# Patient Record
Sex: FEMALE | Race: WHITE | URBAN - METROPOLITAN AREA
[De-identification: names, ages, dates, MRNs, and addresses within clinical notes are randomized per-mention and may not be internally consistent; named-entity substitution may affect disease eponyms.]

---

## 2023-04-02 ENCOUNTER — PATIENT MESSAGE (OUTPATIENT)
Dept: PRIMARY CARE | Facility: CLINIC | Age: 33
End: 2023-04-02
Payer: COMMERCIAL

## 2023-04-02 DIAGNOSIS — K21.9 GERD WITHOUT ESOPHAGITIS: ICD-10-CM

## 2023-04-02 DIAGNOSIS — F41.9 ANXIETY: Primary | ICD-10-CM

## 2023-04-03 RX ORDER — CETIRIZINE HYDROCHLORIDE 10 MG/1
10 TABLET ORAL DAILY
COMMUNITY

## 2023-04-03 RX ORDER — ALPRAZOLAM 0.5 MG/1
TABLET ORAL 2 TIMES DAILY
COMMUNITY
Start: 2022-12-08 | End: 2023-04-03 | Stop reason: SDUPTHER

## 2023-04-03 RX ORDER — OMEPRAZOLE 40 MG/1
40 CAPSULE, DELAYED RELEASE ORAL
COMMUNITY
Start: 2022-12-08 | End: 2023-04-03 | Stop reason: SDUPTHER

## 2023-04-03 RX ORDER — OMALIZUMAB 202.5 MG/1.4ML
INJECTION, SOLUTION SUBCUTANEOUS
COMMUNITY

## 2023-04-03 RX ORDER — ALPRAZOLAM 0.5 MG/1
0.5 TABLET ORAL 2 TIMES DAILY PRN
Qty: 6 TABLET | Refills: 0 | Status: SHIPPED | OUTPATIENT
Start: 2023-04-03 | End: 2023-08-02 | Stop reason: SDUPTHER

## 2023-04-03 RX ORDER — OMEPRAZOLE 40 MG/1
40 CAPSULE, DELAYED RELEASE ORAL
Qty: 30 CAPSULE | Refills: 1 | Status: SHIPPED | OUTPATIENT
Start: 2023-04-03 | End: 2023-07-11 | Stop reason: SDUPTHER

## 2023-07-11 DIAGNOSIS — K21.9 GERD WITHOUT ESOPHAGITIS: ICD-10-CM

## 2023-07-11 RX ORDER — OMEPRAZOLE 40 MG/1
40 CAPSULE, DELAYED RELEASE ORAL
Qty: 30 CAPSULE | Refills: 1 | Status: SHIPPED | OUTPATIENT
Start: 2023-07-11 | End: 2023-08-02 | Stop reason: ALTCHOICE

## 2023-08-02 ENCOUNTER — OFFICE VISIT (OUTPATIENT)
Dept: PRIMARY CARE | Facility: CLINIC | Age: 33
End: 2023-08-02
Payer: COMMERCIAL

## 2023-08-02 VITALS
WEIGHT: 172 LBS | SYSTOLIC BLOOD PRESSURE: 109 MMHG | OXYGEN SATURATION: 98 % | HEART RATE: 66 BPM | BODY MASS INDEX: 25.48 KG/M2 | HEIGHT: 69 IN | DIASTOLIC BLOOD PRESSURE: 69 MMHG | TEMPERATURE: 97.8 F

## 2023-08-02 DIAGNOSIS — G43.009 MIGRAINE WITHOUT AURA AND WITHOUT STATUS MIGRAINOSUS, NOT INTRACTABLE: ICD-10-CM

## 2023-08-02 DIAGNOSIS — K21.9 GERD WITHOUT ESOPHAGITIS: Primary | ICD-10-CM

## 2023-08-02 DIAGNOSIS — F41.9 ANXIETY: ICD-10-CM

## 2023-08-02 DIAGNOSIS — F40.228 AEROPHOBIA: ICD-10-CM

## 2023-08-02 PROCEDURE — 99214 OFFICE O/P EST MOD 30 MIN: CPT | Performed by: INTERNAL MEDICINE

## 2023-08-02 PROCEDURE — 1036F TOBACCO NON-USER: CPT | Performed by: INTERNAL MEDICINE

## 2023-08-02 RX ORDER — ALPRAZOLAM 0.5 MG/1
0.5 TABLET ORAL 2 TIMES DAILY PRN
Qty: 10 TABLET | Refills: 0 | Status: SHIPPED | OUTPATIENT
Start: 2023-08-02 | End: 2023-12-21 | Stop reason: SDUPTHER

## 2023-08-02 RX ORDER — OMEPRAZOLE 40 MG/1
40 CAPSULE, DELAYED RELEASE ORAL
Qty: 60 CAPSULE | Refills: 0 | Status: SHIPPED | OUTPATIENT
Start: 2023-08-02 | End: 2023-08-31

## 2023-08-02 NOTE — PROGRESS NOTES
Subjective   Diamond Mejía is a 32 y.o. female who presents for GERD and Headache.  HPI  Past medical history significant GERD, urticaria and environmental allergies.     See initial visit regarding heartburn and indigestion.  At the time fairly recent in onset without prior history.  Symptoms were primarily waking up with epigastric discomfort and substernal burning.  Alcohol identified as a possible trigger.  We put her on 1 month of daily omeprazole 40 mg which helped and initially had resolution.  In the interim it was manageable and had a refill for as needed use but since April, without any specific known triggers she has been using it daily and still not having good control.  Persistently worse in the morning so she started taking the omeprazole first thing in the morning upon waking up but does not seem to help although she seems to be better in the early afternoon.  Also having more burping and belching associated with it.  No hemoptysis or hematemesis, no hematochezia or melena.     She also developed headaches last month associate with nausea.  Was getting them about 3 times a week last month, mostly in the morning but seem to have resolved as she has not had any this month.  Described as a throbbing/pounding in the front and back of her head.  Complains of associated nausea and light sensitivity, better with lying down in a dark room and taking ibuprofen and/or Excedrin has helped.  No prior symptoms.  No known triggers.  No associated aura or vision changes.  No head trauma.      **COPIED FORWARD FOR REFERENCE**     Family history:  Maternal grandfather-myocardial infarction at 52  Paternal grandfather-pacemaker  Both grandmothers-lung cancer, only 1 was a smoker  Paternal aunt and paternal grandmother-breast cancer     She lives with her girlfriend in [place].  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  No history of tobacco use and no drug use.  Social alcohol use, maybe 3 or 4  "servings in the weekend.  Healthy diet, regular exercise.     Providers:  Allergy and immunology-Dr. Whitaker  GYN -Ruba Landaverde       Objective   /69   Pulse 66   Temp 36.6 °C (97.8 °F)   Ht 1.753 m (5' 9\")   Wt 78 kg (172 lb)   SpO2 98%   BMI 25.40 kg/m²    Physical Exam  Gen: NAD, pleasant, A&;Ox3  HEENT: PERRL, EOMI, MMM, OP clear  Neck: supple, no thyromegaly, no JVD, normal carotid upstroke  Pulm: lungs CTAB, good air movement  CV: RRR, no m/r/g, 2+ DP pulses  Abd: NABS, soft, NT, ND no HSM  Ext: no peripheral edema  Neuro: CN II-XII intact, no focal sensory or motor deficits, normal reflexes    Assessment/Plan     GERD: Without red flags but with poor control recently despite daily PPI.  Also with family history of reflux symptoms.  Trial of maximal medical therapy with twice daily PPI for 1 month, depending on response and recurrence would likely pursue EGD    Headaches: Seems to have classic migraines without aura, currently with decreased frequency after initial flare, possibly due to stress?  -For now continue as needed treatment with ibuprofen or Excedrin  -Reassess based on frequency and triggers     Situational anxiety: Xanax 0.5 mg as needed, I have personally reviewed the [place] [facility] report for this patient. I have considered the risks of abuse, dependence, addiction and diversion in regards to prescribing this type of medication.I did not find any suspicious activity noted on the [place] [facility] report, and therefore, I believe that it is clinically appropriate for this patient to be prescribed this medication.      **COPIED FORWARD FOR REFERENCE**  Recurrent epistaxis: Mild, infrequent; recommended intranasal moisturizer spray, can always reconsider and get ENT referral in the future (see initial visit)     Health maintenance  -Consider metabolic screening, low risk  -Mammogram: We will check with cousins regarding possible genetic " evaluation  -Pap: Normal and HPV (-) 3/5/2022  -DEXA: N/A  -Last colonoscopy: No early screening indicated  -Smoking history: Never  -Counseled regarding diet and exercise  -Immunizations: Reportedly up-to-date  -Followup annually and as needed   Problem List Items Addressed This Visit    None  Visit Diagnoses       GERD without esophagitis    -  Primary    Relevant Medications    omeprazole (PriLOSEC) 40 mg DR capsule    Anxiety        Relevant Medications    ALPRAZolam (Xanax) 0.5 mg tablet                 Montana Ricci MD

## 2023-08-03 PROBLEM — F41.9 ANXIETY: Status: ACTIVE | Noted: 2023-08-03

## 2023-08-03 PROBLEM — J30.89 ENVIRONMENTAL AND SEASONAL ALLERGIES: Status: ACTIVE | Noted: 2023-08-03

## 2023-08-03 PROBLEM — M17.31 POST-TRAUMATIC OSTEOARTHRITIS OF RIGHT KNEE: Status: RESOLVED | Noted: 2018-12-28 | Resolved: 2023-08-03

## 2023-08-03 PROBLEM — R04.0 RECURRENT EPISTAXIS: Status: RESOLVED | Noted: 2023-08-03 | Resolved: 2023-08-03

## 2023-08-03 PROBLEM — L50.2 URTICARIA DUE TO HEAT: Status: ACTIVE | Noted: 2023-08-03

## 2023-08-03 PROBLEM — K21.9 GERD (GASTROESOPHAGEAL REFLUX DISEASE): Status: ACTIVE | Noted: 2023-08-03

## 2023-08-03 PROBLEM — F41.8 SITUATIONAL ANXIETY: Status: ACTIVE | Noted: 2023-08-03

## 2023-08-03 PROBLEM — F40.228 AEROPHOBIA: Status: ACTIVE | Noted: 2023-08-03

## 2023-08-03 PROBLEM — G43.009 MIGRAINE WITHOUT AURA AND WITHOUT STATUS MIGRAINOSUS, NOT INTRACTABLE: Status: ACTIVE | Noted: 2023-08-03

## 2023-08-31 DIAGNOSIS — K21.9 GERD WITHOUT ESOPHAGITIS: ICD-10-CM

## 2023-08-31 RX ORDER — OMEPRAZOLE 40 MG/1
40 CAPSULE, DELAYED RELEASE ORAL
Qty: 60 CAPSULE | Refills: 0 | Status: SHIPPED | OUTPATIENT
Start: 2023-08-31 | End: 2023-11-02

## 2023-11-02 DIAGNOSIS — K21.9 GERD WITHOUT ESOPHAGITIS: ICD-10-CM

## 2023-11-02 RX ORDER — OMEPRAZOLE 40 MG/1
40 CAPSULE, DELAYED RELEASE ORAL
Qty: 60 CAPSULE | Refills: 0 | Status: SHIPPED | OUTPATIENT
Start: 2023-11-02 | End: 2023-12-07

## 2023-12-07 DIAGNOSIS — K21.9 GERD WITHOUT ESOPHAGITIS: ICD-10-CM

## 2023-12-07 RX ORDER — OMEPRAZOLE 40 MG/1
40 CAPSULE, DELAYED RELEASE ORAL
Qty: 60 CAPSULE | Refills: 0 | Status: SHIPPED | OUTPATIENT
Start: 2023-12-07 | End: 2024-04-01 | Stop reason: WASHOUT

## 2023-12-21 ENCOUNTER — OFFICE VISIT (OUTPATIENT)
Dept: PRIMARY CARE | Facility: CLINIC | Age: 33
End: 2023-12-21
Payer: COMMERCIAL

## 2023-12-21 VITALS
DIASTOLIC BLOOD PRESSURE: 64 MMHG | SYSTOLIC BLOOD PRESSURE: 111 MMHG | BODY MASS INDEX: 25.77 KG/M2 | TEMPERATURE: 97.3 F | HEIGHT: 69 IN | HEART RATE: 61 BPM | WEIGHT: 174 LBS | OXYGEN SATURATION: 97 %

## 2023-12-21 DIAGNOSIS — R04.0 RECURRENT EPISTAXIS: ICD-10-CM

## 2023-12-21 DIAGNOSIS — K21.9 GERD WITHOUT ESOPHAGITIS: Primary | ICD-10-CM

## 2023-12-21 DIAGNOSIS — F41.9 ANXIETY: ICD-10-CM

## 2023-12-21 DIAGNOSIS — G43.009 MIGRAINE WITHOUT AURA AND WITHOUT STATUS MIGRAINOSUS, NOT INTRACTABLE: ICD-10-CM

## 2023-12-21 LAB
ALBUMIN SERPL BCP-MCNC: 4.5 G/DL (ref 3.4–5)
ALP SERPL-CCNC: 47 U/L (ref 33–110)
ALT SERPL W P-5'-P-CCNC: 16 U/L (ref 7–45)
ANION GAP SERPL CALC-SCNC: 14 MMOL/L (ref 10–20)
AST SERPL W P-5'-P-CCNC: 19 U/L (ref 9–39)
BILIRUB SERPL-MCNC: 0.4 MG/DL (ref 0–1.2)
BUN SERPL-MCNC: 19 MG/DL (ref 6–23)
CALCIUM SERPL-MCNC: 9.8 MG/DL (ref 8.6–10.6)
CHLORIDE SERPL-SCNC: 105 MMOL/L (ref 98–107)
CO2 SERPL-SCNC: 28 MMOL/L (ref 21–32)
CREAT SERPL-MCNC: 0.8 MG/DL (ref 0.5–1.05)
GFR SERPL CREATININE-BSD FRML MDRD: >90 ML/MIN/1.73M*2
GLUCOSE SERPL-MCNC: 82 MG/DL (ref 74–99)
LIPASE SERPL-CCNC: 15 U/L (ref 9–82)
POTASSIUM SERPL-SCNC: 4.5 MMOL/L (ref 3.5–5.3)
PROT SERPL-MCNC: 6.8 G/DL (ref 6.4–8.2)
SODIUM SERPL-SCNC: 142 MMOL/L (ref 136–145)
VIT B12 SERPL-MCNC: 495 PG/ML (ref 211–911)

## 2023-12-21 PROCEDURE — 82607 VITAMIN B-12: CPT

## 2023-12-21 PROCEDURE — 99214 OFFICE O/P EST MOD 30 MIN: CPT | Performed by: INTERNAL MEDICINE

## 2023-12-21 PROCEDURE — 36415 COLL VENOUS BLD VENIPUNCTURE: CPT

## 2023-12-21 PROCEDURE — 1036F TOBACCO NON-USER: CPT | Performed by: INTERNAL MEDICINE

## 2023-12-21 PROCEDURE — 83690 ASSAY OF LIPASE: CPT

## 2023-12-21 PROCEDURE — 85025 COMPLETE CBC W/AUTO DIFF WBC: CPT

## 2023-12-21 PROCEDURE — 80053 COMPREHEN METABOLIC PANEL: CPT

## 2023-12-21 RX ORDER — PROPRANOLOL HYDROCHLORIDE 60 MG/1
60 CAPSULE, EXTENDED RELEASE ORAL DAILY
Qty: 30 CAPSULE | Refills: 11 | Status: SHIPPED | OUTPATIENT
Start: 2023-12-21 | End: 2024-02-21 | Stop reason: WASHOUT

## 2023-12-21 RX ORDER — ALPRAZOLAM 0.5 MG/1
0.5 TABLET ORAL 2 TIMES DAILY PRN
Qty: 10 TABLET | Refills: 0 | Status: SHIPPED | OUTPATIENT
Start: 2023-12-21 | End: 2024-02-21 | Stop reason: SDUPTHER

## 2023-12-21 NOTE — PROGRESS NOTES
Subjective   Diamond Mejía is a 33 y.o. female who presents for No chief complaint on file..  HPI  Past medical history significant GERD, urticaria and environmental allergies.     See initial visit regarding heartburn and indigestion.  At the time fairly recent in onset without prior history.  Symptoms were primarily waking up with epigastric discomfort and substernal burning.  Alcohol identified as a possible trigger.  At that time, resolution of symptoms with daily omeprazole 40 mg.  In August was having worsening symptoms in the morning and also more burping and belching so we put her on a trial of twice daily with plan to follow-up after 4 weeks but she has stayed on the same dose and today comes in because of 2 weeks of increasing frequency and symptoms after initial improvement.  While initially, feels that the medicine is not really working so well anymore, on further questioning it appears that she does have significantly decreased burning symptoms and she does have worse symptoms if she forgets; current symptoms are typically more related to tightness in the epigastric/lower chest region, mostly postprandial and occasionally with regurgitation of food contents.   No hemoptysis or hematemesis, no hematochezia or melena.     See visit from August regarding development of headaches which seems migrainous in nature, was treating with Excedrin and/or ibuprofen and at the time frequency was improving so no further treatment was recommended.    More recently she has been experiencing increased frequency of headaches, can occur 3-4 times a week, using more ibuprofen as a result.  Previously had a lot of nausea and light sensitivity, the latter is not as predominant recently.  Described as a throbbing/pounding in the front and back of her head.  No known triggers.  No associated aura or vision changes.  No head trauma.    She also has a history of recurrent epistaxis, seems to be worse with stress and she can  "develop \"stress nosebleeds\" which are moderate but she is able to stop the bleeding on her own with conservative measures.  She has had cautery in the past and uses Vaseline to try to keep her nasal mucous membranes moist but still having increased frequency.  Currently about twice a week, usually takes 5 minutes or so to achieve hemostasis.    **COPIED FORWARD FOR REFERENCE**     Family history:  Maternal grandfather-myocardial infarction at 52  Paternal grandfather-pacemaker  Both grandmothers-lung cancer, only 1 was a smoker  Paternal aunt and paternal grandmother-breast cancer     She lives with her girlfriend in [place].  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  No history of tobacco use and no drug use.  Social alcohol use, maybe 3 or 4 servings in the weekend.  Healthy diet, regular exercise.     Providers:  Allergy and immunology-Dr. Whitaker  GYN -Ruba Landaverde       Objective   /64   Pulse 61   Temp 36.3 °C (97.3 °F)   Ht 1.753 m (5' 9\")   Wt 78.9 kg (174 lb)   SpO2 97%   BMI 25.70 kg/m²    Physical Exam  Gen: NAD, pleasant, A&;Ox3  HEENT: PERRL, EOMI, MMM, OP clear, rather vascular nasal septum bilaterally  Neck: supple, no thyromegaly, no JVD, normal carotid upstroke  Pulm: lungs CTAB, good air movement  CV: RRR, no m/r/g, 2+ DP pulses  Abd: NABS, soft, NT, ND no HSM  Ext: no peripheral edema  Neuro: CN II-XII intact, no focal sensory or motor deficits, normal reflexes    Assessment/Plan     GERD: Without red flags but with poor control recently despite twice daily PPI.  Also with family history of reflux symptoms.  Given persistent symptoms despite maximal medical therapy, agreed to pursue EGD.    Headaches: Appears to be fairly typical migraines without aura, currently with increased frequency, possibly due to stress  -Trial of propranolol for prevention  - continue as needed treatment with ibuprofen or Excedrin  -Reassess based on frequency and triggers, consider " referral    Recurrent epistaxis: Refer to ENT, can try Afrin if needed     Situational anxiety: Xanax 0.5 mg as needed, I have personally reviewed the [place] [facility] report for this patient. I have considered the risks of abuse, dependence, addiction and diversion in regards to prescribing this type of medication.I did not find any suspicious activity noted on the [place] [facility] report, and therefore, I believe that it is clinically appropriate for this patient to be prescribed this medication.      **COPIED FORWARD FOR REFERENCE**    Health maintenance  -Consider metabolic screening, low risk  -Mammogram: We will check with cousins regarding possible genetic evaluation  -Pap: Normal and HPV (-) 3/5/2022  -DEXA: N/A  -Last colonoscopy: No early screening indicated  -Smoking history: Never  -Counseled regarding diet and exercise  -Immunizations: Reportedly up-to-date  -Followup annually and as needed   Problem List Items Addressed This Visit    None         Montana Ricci MD

## 2023-12-22 LAB
BASOPHILS # BLD AUTO: 0.05 X10*3/UL (ref 0–0.1)
BASOPHILS NFR BLD AUTO: 0.8 %
EOSINOPHIL # BLD AUTO: 0.37 X10*3/UL (ref 0–0.7)
EOSINOPHIL NFR BLD AUTO: 5.7 %
ERYTHROCYTE [DISTWIDTH] IN BLOOD BY AUTOMATED COUNT: 11.9 % (ref 11.5–14.5)
HCT VFR BLD AUTO: 39.8 % (ref 36–46)
HGB BLD-MCNC: 13.2 G/DL (ref 12–16)
IMM GRANULOCYTES # BLD AUTO: 0.01 X10*3/UL (ref 0–0.7)
IMM GRANULOCYTES NFR BLD AUTO: 0.2 % (ref 0–0.9)
LYMPHOCYTES # BLD AUTO: 2.43 X10*3/UL (ref 1.2–4.8)
LYMPHOCYTES NFR BLD AUTO: 37.4 %
MCH RBC QN AUTO: 31.2 PG (ref 26–34)
MCHC RBC AUTO-ENTMCNC: 33.2 G/DL (ref 32–36)
MCV RBC AUTO: 94 FL (ref 80–100)
MONOCYTES # BLD AUTO: 0.47 X10*3/UL (ref 0.1–1)
MONOCYTES NFR BLD AUTO: 7.2 %
NEUTROPHILS # BLD AUTO: 3.17 X10*3/UL (ref 1.2–7.7)
NEUTROPHILS NFR BLD AUTO: 48.7 %
NRBC BLD-RTO: 0 /100 WBCS (ref 0–0)
PLATELET # BLD AUTO: 312 X10*3/UL (ref 150–450)
RBC # BLD AUTO: 4.23 X10*6/UL (ref 4–5.2)
WBC # BLD AUTO: 6.5 X10*3/UL (ref 4.4–11.3)

## 2024-01-11 ENCOUNTER — ANESTHESIA (OUTPATIENT)
Dept: GASTROENTEROLOGY | Facility: HOSPITAL | Age: 34
End: 2024-01-11
Payer: COMMERCIAL

## 2024-01-11 ENCOUNTER — HOSPITAL ENCOUNTER (OUTPATIENT)
Dept: GASTROENTEROLOGY | Facility: HOSPITAL | Age: 34
Setting detail: OUTPATIENT SURGERY
Discharge: HOME | End: 2024-01-11
Payer: COMMERCIAL

## 2024-01-11 ENCOUNTER — ANESTHESIA EVENT (OUTPATIENT)
Dept: GASTROENTEROLOGY | Facility: HOSPITAL | Age: 34
End: 2024-01-11
Payer: COMMERCIAL

## 2024-01-11 VITALS
SYSTOLIC BLOOD PRESSURE: 116 MMHG | TEMPERATURE: 96.8 F | RESPIRATION RATE: 12 BRPM | BODY MASS INDEX: 24.44 KG/M2 | OXYGEN SATURATION: 100 % | HEIGHT: 69 IN | HEART RATE: 64 BPM | WEIGHT: 165 LBS | DIASTOLIC BLOOD PRESSURE: 73 MMHG

## 2024-01-11 DIAGNOSIS — K21.9 GERD WITHOUT ESOPHAGITIS: Primary | ICD-10-CM

## 2024-01-11 LAB — PREGNANCY TEST URINE, POC: NEGATIVE

## 2024-01-11 PROCEDURE — 81025 URINE PREGNANCY TEST: CPT | Performed by: ANESTHESIOLOGY

## 2024-01-11 PROCEDURE — 3700000001 HC GENERAL ANESTHESIA TIME - INITIAL BASE CHARGE

## 2024-01-11 PROCEDURE — 7100000010 HC PHASE TWO TIME - EACH INCREMENTAL 1 MINUTE

## 2024-01-11 PROCEDURE — 2500000004 HC RX 250 GENERAL PHARMACY W/ HCPCS (ALT 636 FOR OP/ED): Performed by: ANESTHESIOLOGIST ASSISTANT

## 2024-01-11 PROCEDURE — A43239 PR EDG TRANSORAL BIOPSY SINGLE/MULTIPLE: Performed by: ANESTHESIOLOGIST ASSISTANT

## 2024-01-11 PROCEDURE — 88305 TISSUE EXAM BY PATHOLOGIST: CPT | Performed by: PATHOLOGY

## 2024-01-11 PROCEDURE — 3700000002 HC GENERAL ANESTHESIA TIME - EACH INCREMENTAL 1 MINUTE

## 2024-01-11 PROCEDURE — 88305 TISSUE EXAM BY PATHOLOGIST: CPT | Mod: TC,SUR,AHULAB | Performed by: INTERNAL MEDICINE

## 2024-01-11 PROCEDURE — A43239 PR EDG TRANSORAL BIOPSY SINGLE/MULTIPLE: Performed by: ANESTHESIOLOGY

## 2024-01-11 PROCEDURE — 7100000009 HC PHASE TWO TIME - INITIAL BASE CHARGE

## 2024-01-11 PROCEDURE — 43239 EGD BIOPSY SINGLE/MULTIPLE: CPT | Performed by: INTERNAL MEDICINE

## 2024-01-11 RX ORDER — PROPOFOL 10 MG/ML
INJECTION, EMULSION INTRAVENOUS CONTINUOUS PRN
Status: DISCONTINUED | OUTPATIENT
Start: 2024-01-11 | End: 2024-01-11

## 2024-01-11 RX ORDER — NALOXONE HYDROCHLORIDE 1 MG/ML
0.2 INJECTION INTRAMUSCULAR; INTRAVENOUS; SUBCUTANEOUS EVERY 5 MIN PRN
Status: CANCELLED | OUTPATIENT
Start: 2024-01-11

## 2024-01-11 RX ORDER — FLUMAZENIL 0.1 MG/ML
0.2 INJECTION INTRAVENOUS ONCE AS NEEDED
Status: CANCELLED | OUTPATIENT
Start: 2024-01-11

## 2024-01-11 RX ORDER — FENTANYL CITRATE 50 UG/ML
INJECTION, SOLUTION INTRAMUSCULAR; INTRAVENOUS AS NEEDED
Status: DISCONTINUED | OUTPATIENT
Start: 2024-01-11 | End: 2024-01-11

## 2024-01-11 RX ORDER — MIDAZOLAM HYDROCHLORIDE 1 MG/ML
INJECTION INTRAMUSCULAR; INTRAVENOUS AS NEEDED
Status: DISCONTINUED | OUTPATIENT
Start: 2024-01-11 | End: 2024-01-11

## 2024-01-11 RX ORDER — ONDANSETRON HYDROCHLORIDE 2 MG/ML
4 INJECTION, SOLUTION INTRAVENOUS ONCE AS NEEDED
Status: CANCELLED | OUTPATIENT
Start: 2024-01-11

## 2024-01-11 RX ORDER — SODIUM CHLORIDE, SODIUM LACTATE, POTASSIUM CHLORIDE, CALCIUM CHLORIDE 600; 310; 30; 20 MG/100ML; MG/100ML; MG/100ML; MG/100ML
20 INJECTION, SOLUTION INTRAVENOUS CONTINUOUS
Status: CANCELLED | OUTPATIENT
Start: 2024-01-11

## 2024-01-11 RX ORDER — PROPOFOL 10 MG/ML
INJECTION, EMULSION INTRAVENOUS AS NEEDED
Status: DISCONTINUED | OUTPATIENT
Start: 2024-01-11 | End: 2024-01-11

## 2024-01-11 RX ADMIN — PROPOFOL 20 MG: 10 INJECTION, EMULSION INTRAVENOUS at 13:40

## 2024-01-11 RX ADMIN — FENTANYL CITRATE 50 MCG: 50 INJECTION, SOLUTION INTRAMUSCULAR; INTRAVENOUS at 13:41

## 2024-01-11 RX ADMIN — SODIUM CHLORIDE, SODIUM LACTATE, POTASSIUM CHLORIDE, AND CALCIUM CHLORIDE: 600; 310; 30; 20 INJECTION, SOLUTION INTRAVENOUS at 12:41

## 2024-01-11 RX ADMIN — FENTANYL CITRATE 50 MCG: 50 INJECTION, SOLUTION INTRAMUSCULAR; INTRAVENOUS at 13:40

## 2024-01-11 RX ADMIN — MIDAZOLAM HYDROCHLORIDE 2 MG: 1 INJECTION INTRAMUSCULAR; INTRAVENOUS at 13:39

## 2024-01-11 RX ADMIN — PROPOFOL 20 MG: 10 INJECTION, EMULSION INTRAVENOUS at 13:45

## 2024-01-11 RX ADMIN — PROPOFOL 200 MCG/KG/MIN: 10 INJECTION, EMULSION INTRAVENOUS at 13:40

## 2024-01-11 ASSESSMENT — COLUMBIA-SUICIDE SEVERITY RATING SCALE - C-SSRS
2. HAVE YOU ACTUALLY HAD ANY THOUGHTS OF KILLING YOURSELF?: NO
1. IN THE PAST MONTH, HAVE YOU WISHED YOU WERE DEAD OR WISHED YOU COULD GO TO SLEEP AND NOT WAKE UP?: NO
6. HAVE YOU EVER DONE ANYTHING, STARTED TO DO ANYTHING, OR PREPARED TO DO ANYTHING TO END YOUR LIFE?: NO

## 2024-01-11 ASSESSMENT — ENCOUNTER SYMPTOMS
TROUBLE SWALLOWING: 0
ABDOMINAL DISTENTION: 0
BLOOD IN STOOL: 0
RECTAL PAIN: 0
CHILLS: 0
ANAL BLEEDING: 0
UNEXPECTED WEIGHT CHANGE: 0
SHORTNESS OF BREATH: 0
DIARRHEA: 0
COLOR CHANGE: 0
VOMITING: 0
FEVER: 0
ABDOMINAL PAIN: 0
CONSTIPATION: 0
NAUSEA: 0

## 2024-01-11 ASSESSMENT — PAIN SCALES - GENERAL
PAINLEVEL_OUTOF10: 0 - NO PAIN

## 2024-01-11 ASSESSMENT — PAIN - FUNCTIONAL ASSESSMENT: PAIN_FUNCTIONAL_ASSESSMENT: 0-10

## 2024-01-11 NOTE — ANESTHESIA POSTPROCEDURE EVALUATION
Patient: Diamond Mejía    Procedure Summary       Date: 01/11/24 Room / Location: [facility]    Anesthesia Start: 1339 Anesthesia Stop: 1355    Procedure: EGD Diagnosis: GERD without esophagitis    Scheduled Providers: Cathy Bryant MD; Dionisio Vaughan MD; IRAIDA Rios Responsible Provider: Dionisio Vaughan MD    Anesthesia Type: MAC ASA Status: 2            Anesthesia Type: MAC    Vitals Value Taken Time   /73 01/11/24 1426   Temp 36 °C (96.8 °F) 01/11/24 1426   Pulse 68 01/11/24 1428   Resp 12 01/11/24 1426   SpO2 99 % 01/11/24 1428   Vitals shown include unvalidated device data.    Anesthesia Post Evaluation    Patient location during evaluation: PACU  Patient participation: complete - patient participated  Level of consciousness: awake and alert  Pain management: adequate  Airway patency: patent  Cardiovascular status: acceptable and hemodynamically stable  Respiratory status: acceptable, spontaneous ventilation and nonlabored ventilation  Hydration status: acceptable  Postoperative Nausea and Vomiting: none        There were no known notable events for this encounter.

## 2024-01-11 NOTE — H&P
"History Of Present Illness  Diamond Mejía is a 33 y.o. female  who has been having intractable GERD symptoms despite optimal therapy.      Past Medical History  Past Medical History:   Diagnosis Date    GERD (gastroesophageal reflux disease)     Recurrent epistaxis 08/03/2023    Unspecified asthma, uncomplicated     Childhood asthma without complication       Surgical History  Past Surgical History:   Procedure Laterality Date    KNEE CARTILAGE SURGERY      Meniscus repair    TONSILLECTOMY          Social History  She reports that she has never smoked. She has never used smokeless tobacco. She reports current alcohol use. Drug use questions deferred to the physician.    Family History  Family History   Problem Relation Name Age of Onset    ALYSSA disease Mother          Allergies  Amoxicillin    Review of Systems   Constitutional:  Negative for chills, fever and unexpected weight change.   HENT:  Negative for trouble swallowing.    Respiratory:  Negative for shortness of breath.    Cardiovascular:  Negative for chest pain.   Gastrointestinal:  Negative for abdominal distention, abdominal pain, anal bleeding, blood in stool, constipation, diarrhea, nausea, rectal pain and vomiting.   Skin:  Negative for color change.          Physical Exam  Vitals reviewed.   Constitutional:       General: She is awake.   Pulmonary:      Effort: Pulmonary effort is normal.      Breath sounds: Normal breath sounds.   Abdominal:      General: Bowel sounds are normal.      Palpations: Abdomen is soft.      Tenderness: There is no abdominal tenderness.   Neurological:      Mental Status: She is alert and oriented to person, place, and time.   Psychiatric:         Attention and Perception: Attention and perception normal.         Behavior: Behavior normal.            Last Recorded Vitals  Blood pressure 134/51, pulse 83, temperature 36 °C (96.8 °F), resp. rate 16, height 1.753 m (5' 9\"), weight 74.8 kg (165 lb), last menstrual period " 12/19/2023, SpO2 99 %.    Relevant Results  Reviewed chart       Assessment/Plan   Proceed with AGUSTIN Bryant MD

## 2024-01-11 NOTE — ANESTHESIA PREPROCEDURE EVALUATION
Patient: Diamond Mejía    Procedure Information       Date/Time: 01/11/24 1310    Scheduled providers: Cathy Bryant MD; Dionisio Vaughan MD; IRAIDA Rios    Procedure: EGD    Location: [facility]            Relevant Problems   GI   (+) GERD without esophagitis       Clinical information reviewed:   Tobacco  Allergies  Meds   Med Hx  Surg Hx  OB Status  Fam Hx  Soc   Hx        NPO/Void Status  Date of Last Liquid: 01/11/24  Time of Last Liquid: 0500  Date of Last Solid: 01/10/24  Time of Last Solid: 1900  Last Intake Type: Clear fluids           Past Medical History:   Diagnosis Date    GERD (gastroesophageal reflux disease)     Recurrent epistaxis 08/03/2023    Unspecified asthma, uncomplicated     Childhood asthma without complication      Past Surgical History:   Procedure Laterality Date    KNEE CARTILAGE SURGERY      Meniscus repair    TONSILLECTOMY       Social History     Tobacco Use    Smoking status: Never    Smokeless tobacco: Never   Vaping Use    Vaping Use: Never used   Substance Use Topics    Alcohol use: Yes     Comment: 4-5 drinks per week social    Drug use: Defer      Current Outpatient Medications   Medication Instructions    ALPRAZolam (XANAX) 0.5 mg, oral, 2 times daily PRN    cetirizine (ZYRTEC) 10 mg, oral, Daily    L. acidophilus/Bifid. animalis 32 billion cell capsule oral    omalizumab (Xolair) 150 mg injection subcutaneous    omeprazole (PRILOSEC) 40 mg, oral, 2 times daily before meals, Do not crush or chew.    propranolol LA (INDERAL LA) 60 mg, oral, Daily, Do not crush, chew, or split.      Allergies   Allergen Reactions    Amoxicillin Rash     Reaction Date:Childhood        Chemistry    Lab Results   Component Value Date/Time     12/21/2023 1700    K 4.5 12/21/2023 1700     12/21/2023 1700    CO2 28 12/21/2023 1700    BUN 19 12/21/2023 1700    CREATININE 0.80 12/21/2023 1700    Lab Results   Component Value Date/Time    CALCIUM 9.8 12/21/2023  "1700    ALKPHOS 47 12/21/2023 1700    AST 19 12/21/2023 1700    ALT 16 12/21/2023 1700    BILITOT 0.4 12/21/2023 1700          Lab Results   Component Value Date/Time    WBC 6.5 12/21/2023 1700    HGB 13.2 12/21/2023 1700    HCT 39.8 12/21/2023 1700     12/21/2023 1700     No results found for: \"PROTIME\", \"PTT\", \"INR\"  No results found for this or any previous visit (from the past 4464 hour(s)).  No results found for this or any previous visit from the past 1095 days.       Visit Vitals  /51   Pulse 83   Temp 36 °C (96.8 °F)   Resp 16   Ht 1.753 m (5' 9\")   Wt 74.8 kg (165 lb)   LMP 12/19/2023 (Exact Date)   SpO2 99%   BMI 24.37 kg/m²   OB Status Having periods   Smoking Status Never   BSA 1.91 m²        Physical Exam    Airway  Mallampati: II  TM distance: >3 FB  Neck ROM: full     Cardiovascular   Rhythm: regular  Rate: normal     Dental - normal exam     Pulmonary   Breath sounds clear to auscultation     Abdominal - normal exam              Anesthesia Plan    History of general anesthesia?: yes  History of complications of general anesthesia?: no    ASA 2     MAC     intravenous induction   Anesthetic plan and risks discussed with patient.    Plan discussed with CRNA and CAA.        "

## 2024-01-11 NOTE — DISCHARGE INSTRUCTIONS
Patient Instructions after an endoscopy      The anesthetics, sedatives or narcotics which were given to you today will be acting in your body for the next 24 hours, so you might feel a little sleepy or groggy.  This feeling should slowly wear off. Carefully read and follow the instructions.     You received sedation today:  - Do not drive or operate any machinery or power tools of any kind.   - No alcoholic beverages today, not even beer or wine.  - Do not make any important decisions or sign any legal documents.  - No over the counter medications that contain alcohol or that may cause drowsiness.  - Do not make any important decisions or sign any legal documents.    While it is common to experience mild to moderate abdominal distention, gas, or belching after your procedure, if any of these symptoms occur following discharge from the GI Lab or within one week of having your procedure, call the [facility] to be advised whether a visit to your nearest Urgent Care or Emergency Department is indicated.  Take this paper with you if you go.     - If you develop an allergic reaction to the medications that were given during your procedure such as difficulty breathing, rash, hives, severe nausea, vomiting or lightheadedness.- If you experience chest pain, shortness of breath, severe abdominal pain, fevers and chills.    -If you develop signs and symptoms of bleeding such as blood in your spit, if your stools turn black, tarry, or bloody    - If you have not urinated within 8 hours following your procedure.- If your IV site becomes painful, red, inflamed, or looks infected.

## 2024-01-15 LAB
LABORATORY COMMENT REPORT: NORMAL
PATH REPORT.FINAL DX SPEC: NORMAL
PATH REPORT.GROSS SPEC: NORMAL
PATH REPORT.TOTAL CANCER: NORMAL

## 2024-02-14 ENCOUNTER — APPOINTMENT (OUTPATIENT)
Dept: OTOLARYNGOLOGY | Facility: CLINIC | Age: 34
End: 2024-02-14
Payer: COMMERCIAL

## 2024-02-21 ENCOUNTER — OFFICE VISIT (OUTPATIENT)
Dept: PRIMARY CARE | Facility: CLINIC | Age: 34
End: 2024-02-21
Payer: COMMERCIAL

## 2024-02-21 VITALS
DIASTOLIC BLOOD PRESSURE: 66 MMHG | HEART RATE: 78 BPM | WEIGHT: 176 LBS | SYSTOLIC BLOOD PRESSURE: 113 MMHG | TEMPERATURE: 97.6 F | BODY MASS INDEX: 25.99 KG/M2 | OXYGEN SATURATION: 97 %

## 2024-02-21 DIAGNOSIS — F41.9 ANXIETY: ICD-10-CM

## 2024-02-21 DIAGNOSIS — G43.009 MIGRAINE WITHOUT AURA AND WITHOUT STATUS MIGRAINOSUS, NOT INTRACTABLE: ICD-10-CM

## 2024-02-21 DIAGNOSIS — K21.9 GERD WITHOUT ESOPHAGITIS: Primary | ICD-10-CM

## 2024-02-21 PROCEDURE — 99213 OFFICE O/P EST LOW 20 MIN: CPT | Performed by: INTERNAL MEDICINE

## 2024-02-21 PROCEDURE — 1036F TOBACCO NON-USER: CPT | Performed by: INTERNAL MEDICINE

## 2024-02-21 RX ORDER — ALPRAZOLAM 0.5 MG/1
0.5 TABLET ORAL 2 TIMES DAILY PRN
Qty: 10 TABLET | Refills: 0 | Status: SHIPPED | OUTPATIENT
Start: 2024-02-21 | End: 2024-02-26

## 2024-02-21 NOTE — PROGRESS NOTES
"Subjective   Diamond Mejía is a 33 y.o. female who presents for Follow-up.  HPI  Past medical history significant GERD, migraines, urticaria and environmental allergies.    See visit 12/21/2023 regarding GERD and migraines.    She had an upper endoscopy on 1/11/2024, other than mild antral gastritis, probable mild hiatal hernia but overall reassuring biopsies negative for H. pylori.  Notably, at the time her symptoms had already been improving.  She has found certain additional triggers and by avoiding or limiting alcohol, carbonated beverages, coffee, acidic foods her symptoms are much better controlled.  At this point she has been able to discontinue daily omeprazole and only using it prophylactically if she is potentially going to be exposed to one of her triggers.  Stress may have also played a role.    The segue's into her headaches which she also reports have improved significantly, from several per week to none in the past month and in retrospect she believes these are also triggered by stress.  She did not start propranolol as the frequency has decreased and they have always been fairly responsive to OTC treatment.      She also has a history of recurrent epistaxis, seems to be worse with stress and she can develop \"stress nosebleeds\" which are moderate but she is able to stop the bleeding on her own with conservative measures.  She has had cautery in the past and uses Vaseline to try to keep her nasal mucous membranes moist but still having increased frequency.  Currently about twice a week, usually takes 5 minutes or so to achieve hemostasis.  She is still planning on seeing ENT about this, referral previously provided.    **COPIED FORWARD FOR REFERENCE**     Family history:  Maternal grandfather-myocardial infarction at 52  Paternal grandfather-pacemaker  Both grandmothers-lung cancer, only 1 was a smoker  Paternal aunt and paternal grandmother-breast cancer     She lives with her girlfriend in " [place].  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  No history of tobacco use and no drug use.  Social alcohol use, maybe 3 or 4 servings in the weekend.  Healthy diet, regular exercise.     Providers:  Allergy and immunology-Dr. Whitaker  GYN -Ruba Landaverde       Objective   /66   Pulse 78   Temp 36.4 °C (97.6 °F)   Wt 79.8 kg (176 lb)   SpO2 97%   BMI 25.99 kg/m²    Physical Exam  Gen: NAD, pleasant, A&;Ox3  HEENT: PERRL, EOMI, MMM, OP clear, rather vascular nasal septum bilaterally  Neck: supple, no thyromegaly, no JVD, normal carotid upstroke  Pulm: lungs CTAB, good air movement  CV: RRR, no m/r/g, 2+ DP pulses  Abd: NABS, soft, NT, ND no HSM  Ext: no peripheral edema  Neuro: CN II-XII intact, no focal sensory or motor deficits, normal reflexes    Assessment/Plan     GERD: Overall improved  -EGD 1/11/2024 significant only for mild antral gastritis and suspected mild hiatal hernia  -Continue behavioral modifications for control  -Continue prophylactic PPI when needed    Headaches: Appears to be fairly typical migraines without aura, increased frequency with times of stress  - continue as needed treatment with ibuprofen or Excedrin  -Reassess based on frequency and triggers, consider referral or prophylactic propranolol    Recurrent epistaxis: Refer to ENT, can try Afrin if needed     Situational anxiety: Xanax 0.5 mg as needed, I have personally reviewed the [place] [facility] report for this patient. I have considered the risks of abuse, dependence, addiction and diversion in regards to prescribing this type of medication.I did not find any suspicious activity noted on the [place] [facility] report, and therefore, I believe that it is clinically appropriate for this patient to be prescribed this medication.      **COPIED FORWARD FOR REFERENCE**    Health maintenance  -Consider metabolic screening, low risk  -Mammogram: We will check with cousins  regarding possible genetic evaluation  -Pap: Normal and HPV (-) 3/5/2022  -DEXA: N/A  -Last colonoscopy: No early screening indicated  -Smoking history: Never  -Counseled regarding diet and exercise  -Immunizations: Reportedly up-to-date  -Followup annually and as needed   Problem List Items Addressed This Visit    None         Montana Ricci MD

## 2024-04-01 ENCOUNTER — OFFICE VISIT (OUTPATIENT)
Dept: DERMATOLOGY | Facility: HOSPITAL | Age: 34
End: 2024-04-01
Payer: COMMERCIAL

## 2024-04-01 DIAGNOSIS — L81.4 LENTIGO: ICD-10-CM

## 2024-04-01 DIAGNOSIS — D22.9 MULTIPLE BENIGN NEVI: Primary | ICD-10-CM

## 2024-04-01 DIAGNOSIS — Z12.83 SCREENING EXAM FOR SKIN CANCER: ICD-10-CM

## 2024-04-01 DIAGNOSIS — L72.0 MILIA: ICD-10-CM

## 2024-04-01 PROCEDURE — 1036F TOBACCO NON-USER: CPT | Performed by: DERMATOLOGY

## 2024-04-01 PROCEDURE — 99213 OFFICE O/P EST LOW 20 MIN: CPT | Performed by: DERMATOLOGY

## 2024-04-01 PROCEDURE — 99203 OFFICE O/P NEW LOW 30 MIN: CPT | Performed by: DERMATOLOGY

## 2024-04-01 ASSESSMENT — DERMATOLOGY PATIENT ASSESSMENT
ARE YOU ON BIRTH CONTROL: NO
DO YOU HAVE ANY NEW OR CHANGING LESIONS: NO
DO YOU USE A TANNING BED: YES, PREVIOUSLY
ARE YOU TRYING TO GET PREGNANT: NO
DO YOU HAVE IRREGULAR MENSTRUAL CYCLES: NO
DO YOU USE SUNSCREEN: DAILY
ARE YOU AN ORGAN TRANSPLANT RECIPIENT: NO
HAVE YOU HAD OR DO YOU HAVE VASCULAR DISEASE: NO
HAVE YOU HAD OR DO YOU HAVE A STAPH INFECTION: NO

## 2024-04-01 ASSESSMENT — DERMATOLOGY QUALITY OF LIFE (QOL) ASSESSMENT
RATE HOW EMOTIONALLY BOTHERED YOU ARE BY YOUR SKIN PROBLEM (FOR EXAMPLE, WORRY, EMBARRASSMENT, FRUSTRATION): 0 - NEVER BOTHERED
RATE HOW BOTHERED YOU ARE BY SYMPTOMS OF YOUR SKIN PROBLEM (EG, ITCHING, STINGING BURNING, HURTING OR SKIN IRRITATION): 0 - NEVER BOTHERED
ARE THERE EXCLUSIONS OR EXCEPTIONS FOR THE QUALITY OF LIFE ASSESSMENT: NO
DATE THE QUALITY-OF-LIFE ASSESSMENT WAS COMPLETED: 66931
RATE HOW BOTHERED YOU ARE BY EFFECTS OF YOUR SKIN PROBLEMS ON YOUR ACTIVITIES (EG, GOING OUT, ACCOMPLISHING WHAT YOU WANT, WORK ACTIVITIES OR YOUR RELATIONSHIPS WITH OTHERS): 0 - NEVER BOTHERED

## 2024-04-01 ASSESSMENT — PATIENT GLOBAL ASSESSMENT (PGA): PATIENT GLOBAL ASSESSMENT: PATIENT GLOBAL ASSESSMENT:  1 - CLEAR

## 2024-04-01 ASSESSMENT — ITCH NUMERIC RATING SCALE: HOW SEVERE IS YOUR ITCHING?: 0

## 2024-04-01 NOTE — PATIENT INSTRUCTIONS
Milia - on face  - Salicylic acid containing acne wash - Neutrogena grapefruit  - non-comedogenic (non-acne forming) moisturizers - Neutrogena, CeraVe, Cetaphil,

## 2024-07-11 DIAGNOSIS — Z29.89 NEED FOR MALARIA PROPHYLAXIS: Primary | ICD-10-CM

## 2024-07-11 RX ORDER — ATOVAQUONE AND PROGUANIL HYDROCHLORIDE 250; 100 MG/1; MG/1
1 TABLET, FILM COATED ORAL DAILY
Qty: 17 TABLET | Refills: 0 | Status: SHIPPED | OUTPATIENT
Start: 2024-07-11 | End: 2024-07-28

## 2024-07-11 NOTE — PROGRESS NOTES
Subjective   Patient ID: Diamond Mejía is a 33 y.o. female who presents for No chief complaint on file..      Objective   Physical Exam    There were no vitals taken for this visit.       Assessment/Plan         Montana Ricci MD

## 2024-07-13 ENCOUNTER — PATIENT MESSAGE (OUTPATIENT)
Dept: PRIMARY CARE | Facility: CLINIC | Age: 34
End: 2024-07-13
Payer: COMMERCIAL

## 2024-07-13 DIAGNOSIS — F41.9 ANXIETY: ICD-10-CM

## 2024-07-15 RX ORDER — ALPRAZOLAM 0.5 MG/1
0.5 TABLET ORAL 2 TIMES DAILY PRN
Qty: 10 TABLET | Refills: 0 | Status: SHIPPED | OUTPATIENT
Start: 2024-07-15 | End: 2024-07-20

## 2024-11-06 ENCOUNTER — APPOINTMENT (OUTPATIENT)
Dept: OTOLARYNGOLOGY | Facility: CLINIC | Age: 34
End: 2024-11-06
Payer: COMMERCIAL

## 2024-11-06 VITALS
BODY MASS INDEX: 25.53 KG/M2 | DIASTOLIC BLOOD PRESSURE: 80 MMHG | HEIGHT: 70 IN | SYSTOLIC BLOOD PRESSURE: 121 MMHG | TEMPERATURE: 97.9 F

## 2024-11-06 DIAGNOSIS — J34.89 NASAL MUCOSA DRY: ICD-10-CM

## 2024-11-06 DIAGNOSIS — R04.0 ANTERIOR EPISTAXIS: Primary | ICD-10-CM

## 2024-11-06 PROCEDURE — 1036F TOBACCO NON-USER: CPT | Performed by: OTOLARYNGOLOGY

## 2024-11-06 PROCEDURE — 99203 OFFICE O/P NEW LOW 30 MIN: CPT | Performed by: OTOLARYNGOLOGY

## 2024-11-06 PROCEDURE — 30901 CONTROL OF NOSEBLEED: CPT | Performed by: OTOLARYNGOLOGY

## 2024-11-06 NOTE — PROGRESS NOTES
Impression:  1. Anterior epistaxis        2. Nasal mucosa dry             RECOMMENDATIONS/PLAN :  In the office today I was able to cauterize her right anterior septum with silver nitrate.  She will avoid blowing her nose for the next 6 to 7 days and then start using saline in the nose as well as Vaseline along the anterior septum to keep everything moist.  She will let me know how she is doing over the next couple of weeks and follow-up as needed.      **This electronic medical record note was created with the use of voice recognition software.  Despite proofreading, typographical or grammatical errors may be present that could affect meaning of content **    Subjective   Patient ID:     Diamond Mejía is a 34 y.o. female who presents to the office today complaining of intermittent bleeding from her nose and her most recent bleed was from the right nostril.  She denies any trauma to the nose.  No infectious drainage fever or chills.  No significant bleeding down the back of the nose and throat.  No history of any bleeding disorder.  Her blood pressure has been well-controlled.    ROS:  A detailed 12 system review of systems is noted on the intake form has been reviewed with the patient with details noted in the HPI and scanned into the patient's medical record.    Objective     Past Medical History:   Diagnosis Date    GERD (gastroesophageal reflux disease)     Recurrent epistaxis 08/03/2023    Unspecified asthma, uncomplicated ([facility]-HCC)     Childhood asthma without complication        Past Surgical History:   Procedure Laterality Date    KNEE CARTILAGE SURGERY      Meniscus repair    TONSILLECTOMY          Allergies   Allergen Reactions    Amoxicillin Rash     Reaction Date:Childhood          Current Outpatient Medications:     cetirizine (ZyrTEC) 10 mg tablet, Take 1 tablet (10 mg) by mouth once daily., Disp: , Rfl:     omalizumab (Xolair) 150 mg injection, Inject under the skin., Disp: , Rfl:     ALPRAZolam  "(Xanax) 0.5 mg tablet, Take 1 tablet (0.5 mg) by mouth 2 times a day as needed for anxiety for up to 5 days., Disp: 10 tablet, Rfl: 0     Tobacco Use: Low Risk  (11/6/2024)    Patient History     Smoking Tobacco Use: Never     Smokeless Tobacco Use: Never     Passive Exposure: Not on file        Alcohol Use: Not on file        Social History     Substance and Sexual Activity   Drug Use Defer        Physical Exam:  Visit Vitals  /80   Temp 36.6 °C (97.9 °F) (Temporal)   Ht 1.778 m (5' 10\")   BMI 25.53 kg/m²   OB Status Having periods   Smoking Status Never   BSA 2 m²      General: Patient is alert, oriented, cooperative in no apparent distress.  Head: Normocephalic, atraumatic.  Eyes: PERRL, EOMI, Conjunctiva is clear. No nystagmus.  Ears: Right Ear-- Pinna is normal.  External auditory canal is patent. Tympanic membrane is [intact, translucent and has good mobility with my pneumatic otoscope. No effusion].  Mastoid is nontender.  Left ear-- Pinna is normal.  External auditory canal is patent. Tympanic membrane is [intact, translucent and has good mobility with my pneumatic otoscope.  No effusion].  Mastoid is nontender.  Nose: Septum is relatively straight and she does have a few prominent blood vessels along the right anterior septum as a source of recent bleeding..  No septal perforation or lesions. No septal hematoma/ seroma.  No signs of bleeding.  Inferior turbinates are normal.   No evidence of intranasal polyps.  No infectious drainage.  Throat:  Floor of mouth is clear, no masses.  Tongue appears normal, no lesions or masses. Gums, gingiva, buccal mucosa appear pink and moist, no lesions. Teeth are in good repair.  No obvious dental infections.  Peritonsillar regions appear symmetric without swelling.  Hard and soft palate appear normal, no obvious cleft. Uvula is midline.  Oropharynx: No lesions. Retropharyngeal wall is flat.  No active postnasal drip.  Neck: Supple,  no lymphadenopathy.  No " masses.  Salivary Glands: Symmetric bilaterally.  No palpable masses.  No evidence of acute infection or salivary stones  Neurologic: Cranial Nerves 2-12 are grossly intact without focal deficits. Cerebellar function testing is normal.     Results:   []    Procedure:   Pre OP Diagnosis:  Anterior epistaxis right anterior septum  Post OP Diagnosis: Same  Procedure: Control of Epistaxis right anterior septum using Silver Nitrate Cauterization  Surgeon: Jose Alejandro Booth DO  Assist:  None   Anesthesia: None required  EBL: minimal  Complications: None  Disposition:  The patient tolerated the procedure well.  There were no complications.    Findings: The patient had prominent blood vessels along the right anterior septum    Procedure:  After informed consent was obtained, the patient was sat up in the ENT chair.  Under direct visualization, I used Silver nitrate to cauterize several vessels along the right anterior septum.  After doing so, there was no further evidence of bleeding.  The patient tolerated the procedure well and was discharged home in stable condition.    Jose Alejandro Booth DO

## 2025-01-03 ENCOUNTER — APPOINTMENT (OUTPATIENT)
Dept: PRIMARY CARE | Facility: CLINIC | Age: 35
End: 2025-01-03
Payer: COMMERCIAL

## 2025-01-03 VITALS
SYSTOLIC BLOOD PRESSURE: 113 MMHG | HEART RATE: 71 BPM | WEIGHT: 180.8 LBS | HEIGHT: 70 IN | DIASTOLIC BLOOD PRESSURE: 78 MMHG | BODY MASS INDEX: 25.88 KG/M2 | TEMPERATURE: 97.7 F | OXYGEN SATURATION: 96 %

## 2025-01-03 DIAGNOSIS — F41.9 ANXIETY: ICD-10-CM

## 2025-01-03 DIAGNOSIS — Z13.71 SCREENING FOR GENETIC DISEASE CARRIER STATUS: ICD-10-CM

## 2025-01-03 DIAGNOSIS — Z00.00 ENCOUNTER FOR WELLNESS EXAMINATION: Primary | ICD-10-CM

## 2025-01-03 LAB
ALBUMIN SERPL BCP-MCNC: 4.5 G/DL (ref 3.4–5)
ALP SERPL-CCNC: 55 U/L (ref 33–110)
ALT SERPL W P-5'-P-CCNC: 15 U/L (ref 7–45)
ANION GAP SERPL CALC-SCNC: 14 MMOL/L (ref 10–20)
AST SERPL W P-5'-P-CCNC: 17 U/L (ref 9–39)
BILIRUB SERPL-MCNC: 0.7 MG/DL (ref 0–1.2)
BUN SERPL-MCNC: 11 MG/DL (ref 6–23)
CALCIUM SERPL-MCNC: 9.5 MG/DL (ref 8.6–10.6)
CHLORIDE SERPL-SCNC: 104 MMOL/L (ref 98–107)
CHOLEST SERPL-MCNC: 204 MG/DL (ref 0–199)
CHOLESTEROL/HDL RATIO: 2.8
CO2 SERPL-SCNC: 26 MMOL/L (ref 21–32)
CREAT SERPL-MCNC: 0.7 MG/DL (ref 0.5–1.05)
EGFRCR SERPLBLD CKD-EPI 2021: >90 ML/MIN/1.73M*2
ERYTHROCYTE [DISTWIDTH] IN BLOOD BY AUTOMATED COUNT: 12.1 % (ref 11.5–14.5)
GLUCOSE SERPL-MCNC: 81 MG/DL (ref 74–99)
HCT VFR BLD AUTO: 44.8 % (ref 36–46)
HDLC SERPL-MCNC: 71.9 MG/DL
HGB BLD-MCNC: 14.5 G/DL (ref 12–16)
LDLC SERPL CALC-MCNC: 110 MG/DL
MCH RBC QN AUTO: 30.3 PG (ref 26–34)
MCHC RBC AUTO-ENTMCNC: 32.4 G/DL (ref 32–36)
MCV RBC AUTO: 94 FL (ref 80–100)
NON HDL CHOLESTEROL: 132 MG/DL (ref 0–149)
NRBC BLD-RTO: 0 /100 WBCS (ref 0–0)
PLATELET # BLD AUTO: 274 X10*3/UL (ref 150–450)
POTASSIUM SERPL-SCNC: 4.4 MMOL/L (ref 3.5–5.3)
PROT SERPL-MCNC: 7.1 G/DL (ref 6.4–8.2)
RBC # BLD AUTO: 4.79 X10*6/UL (ref 4–5.2)
SODIUM SERPL-SCNC: 140 MMOL/L (ref 136–145)
TRIGL SERPL-MCNC: 109 MG/DL (ref 0–149)
VLDL: 22 MG/DL (ref 0–40)
WBC # BLD AUTO: 5.8 X10*3/UL (ref 4.4–11.3)

## 2025-01-03 PROCEDURE — 85027 COMPLETE CBC AUTOMATED: CPT

## 2025-01-03 PROCEDURE — 99212 OFFICE O/P EST SF 10 MIN: CPT | Performed by: INTERNAL MEDICINE

## 2025-01-03 PROCEDURE — 99395 PREV VISIT EST AGE 18-39: CPT | Performed by: INTERNAL MEDICINE

## 2025-01-03 PROCEDURE — 80061 LIPID PANEL: CPT

## 2025-01-03 PROCEDURE — 90471 IMMUNIZATION ADMIN: CPT | Performed by: INTERNAL MEDICINE

## 2025-01-03 PROCEDURE — 80053 COMPREHEN METABOLIC PANEL: CPT

## 2025-01-03 PROCEDURE — 1036F TOBACCO NON-USER: CPT | Performed by: INTERNAL MEDICINE

## 2025-01-03 PROCEDURE — 3008F BODY MASS INDEX DOCD: CPT | Performed by: INTERNAL MEDICINE

## 2025-01-03 PROCEDURE — 90673 RIV3 VACCINE NO PRESERV IM: CPT | Performed by: INTERNAL MEDICINE

## 2025-01-03 RX ORDER — ALPRAZOLAM 0.5 MG/1
0.5 TABLET ORAL 2 TIMES DAILY PRN
Qty: 10 TABLET | Refills: 0 | Status: SHIPPED | OUTPATIENT
Start: 2025-01-03 | End: 2025-01-08

## 2025-01-03 RX ORDER — MULTIVIT-MIN/IRON FUM/FOLIC AC 7.5 MG-4
1 TABLET ORAL DAILY
COMMUNITY

## 2025-01-03 ASSESSMENT — PROMIS GLOBAL HEALTH SCALE
RATE_QUALITY_OF_LIFE: EXCELLENT
CARRYOUT_SOCIAL_ACTIVITIES: VERY GOOD
CARRYOUT_PHYSICAL_ACTIVITIES: COMPLETELY
RATE_AVERAGE_FATIGUE: MILD
RATE_PHYSICAL_HEALTH: VERY GOOD
EMOTIONAL_PROBLEMS: RARELY
RATE_MENTAL_HEALTH: VERY GOOD
RATE_GENERAL_HEALTH: VERY GOOD
RATE_SOCIAL_SATISFACTION: EXCELLENT
RATE_AVERAGE_PAIN: 2

## 2025-01-03 NOTE — PROGRESS NOTES
"Subjective   Diamond Mejía is a 34 y.o. female who presents for Annual Exam.  HPI  Past medical history significant GERD, migraines, urticaria and environmental allergies.    Interim  - saw ENT, Dr. Booth, 11/6/2024; had cauterization of right anterior septum   - Dr. Solorio, 4/1/2024, [facility]     No concerns, prior issues all fairly well controlled.     She lives with her wife in [place].  Teacher in [place],  [employer] , physical education.  No history of tobacco use and no drug use.  Social alcohol use, maybe 3 or 4 servings in the weekend.  Healthy diet, regular exercise.     Providers:  Allergy and immunology-Dr. Whitaker  GYN -Ruba Landaverde       Objective   /78 (BP Location: Right arm, Patient Position: Sitting, BP Cuff Size: Small adult)   Pulse 71   Temp 36.5 °C (97.7 °F) (Temporal)   Ht 1.778 m (5' 10\")   Wt 82 kg (180 lb 12.8 oz)   LMP 12/22/2024   SpO2 96%   BMI 25.94 kg/m²    Physical Exam  Gen: NAD, pleasant, A&;Ox3  HEENT: PERRL, EOMI, MMM, OP clear  Neck: supple, no thyromegaly, no JVD, normal carotid upstroke  Pulm: lungs CTAB, good air movement  CV: RRR, no m/r/g, 2+ DP pulses  Abd: NABS, soft, NT, ND no HSM  Ext: no peripheral edema  Neuro: CN II-XII intact, no focal sensory or motor deficits, normal reflexes    Assessment/Plan     GERD: Overall improved  -EGD 1/11/2024 significant only for mild antral gastritis and suspected mild hiatal hernia  -Continue behavioral modifications for control  -Continue prophylactic PPI when needed    Headaches: Appears to be fairly typical migraines without aura, increased frequency with times of stress; none recent  - continue as needed treatment with ibuprofen or Excedrin  -Reassess based on frequency and triggers, consider referral or prophylactic propranolol    Recurrent epistaxis:    - saw ENT, Dr. Booth, 11/6/2024; had cauterization of right anterior septum     Situational anxiety: Xanax 0.5 mg as needed, primarily for flying    A/I: continue " on q6week Xolair for heat urticaria  - prn antihistamine    Family hx of breast cancer and cardiomyopathy, interested in conceiving:  refer to [employer]  -Consider metabolic screening, low risk  -Mammogram: NA  -Pap: Normal and HPV (-) 3/5/2022  -DEXA: N/A  -Last colonoscopy: No early screening indicated  -Smoking history: Never  -Counseled regarding diet and exercise  -Immunizations: influenza today  -Followup annually and as needed   Problem List Items Addressed This Visit    None         Montana Ricci MD

## 2025-06-30 ENCOUNTER — OFFICE VISIT (OUTPATIENT)
Dept: OBSTETRICS AND GYNECOLOGY | Facility: CLINIC | Age: 35
End: 2025-06-30
Payer: COMMERCIAL

## 2025-06-30 VITALS
DIASTOLIC BLOOD PRESSURE: 69 MMHG | BODY MASS INDEX: 25.87 KG/M2 | WEIGHT: 180.3 LBS | SYSTOLIC BLOOD PRESSURE: 118 MMHG | HEART RATE: 71 BPM

## 2025-06-30 DIAGNOSIS — Z31.9 PATIENT DESIRES PREGNANCY: Primary | ICD-10-CM

## 2025-06-30 DIAGNOSIS — Z01.419 WOMEN'S ANNUAL ROUTINE GYNECOLOGICAL EXAMINATION: Primary | ICD-10-CM

## 2025-06-30 PROCEDURE — 1036F TOBACCO NON-USER: CPT | Performed by: OBSTETRICS & GYNECOLOGY

## 2025-06-30 PROCEDURE — 99385 PREV VISIT NEW AGE 18-39: CPT | Performed by: OBSTETRICS & GYNECOLOGY

## 2025-06-30 ASSESSMENT — PATIENT HEALTH QUESTIONNAIRE - PHQ9
1. LITTLE INTEREST OR PLEASURE IN DOING THINGS: NOT AT ALL
2. FEELING DOWN, DEPRESSED OR HOPELESS: NOT AT ALL
SUM OF ALL RESPONSES TO PHQ9 QUESTIONS 1 AND 2: 0

## 2025-06-30 ASSESSMENT — PAIN SCALES - GENERAL: PAINLEVEL_OUTOF10: 0-NO PAIN

## 2025-06-30 NOTE — PROGRESS NOTES
Diamond Mejía 34 y.o. y/o who presents for annual gyn exam.      Preventive health  Cervical cancer screening:    HPV vaccine {HPVvax:87778}  Breast cancer screening ***  Colon cancer screening ***  STI screening today, patient request  Lipids, TSH, CBC {YES wildcard/NO:60}    Reproductive Life Planning      -------------------------------------  HPI      Gynecologic History:    Menarche: ***  Menses: Q 28 days  Last 4 days  Moderate cramping  1-2 heavy days  Last Pap: 2022, normal  Denies abnromal  History of Dysplasia: denies  STI history: ***  Sexually active: ***    History reviewed and updated in patient's History Tab      Social History:  Occupation:   Exercise:  Abuse:        Vitals:    06/30/25 1424   BP: 118/69   Pulse: 71       OBGyn Exam

## 2025-11-17 ENCOUNTER — APPOINTMENT (OUTPATIENT)
Dept: DERMATOLOGY | Facility: HOSPITAL | Age: 35
End: 2025-11-17
Payer: COMMERCIAL

## 2026-01-08 ENCOUNTER — APPOINTMENT (OUTPATIENT)
Dept: PRIMARY CARE | Facility: CLINIC | Age: 36
End: 2026-01-08
Payer: COMMERCIAL